# Patient Record
Sex: MALE | Race: WHITE | Employment: OTHER | URBAN - NONMETROPOLITAN AREA
[De-identification: names, ages, dates, MRNs, and addresses within clinical notes are randomized per-mention and may not be internally consistent; named-entity substitution may affect disease eponyms.]

---

## 2020-11-26 ENCOUNTER — HOSPITAL ENCOUNTER (INPATIENT)
Age: 85
LOS: 4 days | Discharge: HOME | DRG: 682 | End: 2020-11-30
Attending: FAMILY MEDICINE
Payer: MEDICARE

## 2020-11-26 PROBLEM — U07.1 COVID-19 VIRUS INFECTION: Status: ACTIVE | Noted: 2020-11-26

## 2020-11-26 LAB — FIBRINOGEN: 311 MG/100ML (ref 155–475)

## 2020-11-26 PROCEDURE — 86140 C-REACTIVE PROTEIN: CPT

## 2020-11-26 PROCEDURE — 99223 1ST HOSP IP/OBS HIGH 75: CPT | Performed by: INTERNAL MEDICINE

## 2020-11-26 PROCEDURE — 82728 ASSAY OF FERRITIN: CPT

## 2020-11-26 PROCEDURE — 85385 FIBRINOGEN ANTIGEN: CPT

## 2020-11-26 PROCEDURE — 36415 COLL VENOUS BLD VENIPUNCTURE: CPT

## 2020-11-26 PROCEDURE — 1200000000 HC SEMI PRIVATE

## 2020-11-26 RX ORDER — PANTOPRAZOLE SODIUM 40 MG/1
40 TABLET, DELAYED RELEASE ORAL DAILY
Status: DISCONTINUED | OUTPATIENT
Start: 2020-11-27 | End: 2020-11-30 | Stop reason: HOSPADM

## 2020-11-26 RX ORDER — ROSUVASTATIN CALCIUM 10 MG/1
10 TABLET, COATED ORAL DAILY
COMMUNITY

## 2020-11-26 RX ORDER — GUAIFENESIN/DEXTROMETHORPHAN 100-10MG/5
5 SYRUP ORAL EVERY 4 HOURS PRN
Status: DISCONTINUED | OUTPATIENT
Start: 2020-11-26 | End: 2020-11-30 | Stop reason: HOSPADM

## 2020-11-26 RX ORDER — ACETAMINOPHEN 325 MG/1
650 TABLET ORAL EVERY 6 HOURS PRN
Status: DISCONTINUED | OUTPATIENT
Start: 2020-11-26 | End: 2020-11-30 | Stop reason: HOSPADM

## 2020-11-26 RX ORDER — LISINOPRIL 20 MG/1
20 TABLET ORAL 2 TIMES DAILY
COMMUNITY

## 2020-11-26 RX ORDER — VITAMIN B COMPLEX
2000 TABLET ORAL DAILY
Status: DISCONTINUED | OUTPATIENT
Start: 2020-11-27 | End: 2020-11-30 | Stop reason: HOSPADM

## 2020-11-26 RX ORDER — AMLODIPINE BESYLATE 5 MG/1
5 TABLET ORAL DAILY
Status: DISCONTINUED | OUTPATIENT
Start: 2020-11-27 | End: 2020-11-30 | Stop reason: HOSPADM

## 2020-11-26 RX ORDER — ROSUVASTATIN CALCIUM 10 MG/1
10 TABLET, COATED ORAL DAILY
Status: DISCONTINUED | OUTPATIENT
Start: 2020-11-27 | End: 2020-11-30 | Stop reason: HOSPADM

## 2020-11-26 RX ORDER — PANTOPRAZOLE SODIUM 40 MG/1
40 TABLET, DELAYED RELEASE ORAL DAILY
COMMUNITY

## 2020-11-26 RX ORDER — TAMSULOSIN HYDROCHLORIDE 0.4 MG/1
0.4 CAPSULE ORAL DAILY
Status: DISCONTINUED | OUTPATIENT
Start: 2020-11-27 | End: 2020-11-30 | Stop reason: HOSPADM

## 2020-11-26 RX ORDER — CLOPIDOGREL BISULFATE 75 MG/1
75 TABLET ORAL DAILY
Status: DISCONTINUED | OUTPATIENT
Start: 2020-11-27 | End: 2020-11-30 | Stop reason: HOSPADM

## 2020-11-26 RX ORDER — SODIUM CHLORIDE 9 MG/ML
INJECTION, SOLUTION INTRAVENOUS CONTINUOUS
Status: DISCONTINUED | OUTPATIENT
Start: 2020-11-26 | End: 2020-11-28

## 2020-11-26 RX ORDER — TRAMADOL HYDROCHLORIDE 50 MG/1
50 TABLET ORAL EVERY 6 HOURS PRN
Status: DISCONTINUED | OUTPATIENT
Start: 2020-11-26 | End: 2020-11-30 | Stop reason: HOSPADM

## 2020-11-26 RX ORDER — TAMSULOSIN HYDROCHLORIDE 0.4 MG/1
0.4 CAPSULE ORAL DAILY
COMMUNITY

## 2020-11-26 RX ORDER — TRAMADOL HYDROCHLORIDE 50 MG/1
50 TABLET ORAL EVERY 6 HOURS PRN
Status: ON HOLD | COMMUNITY
Start: 2020-11-19 | End: 2020-11-30 | Stop reason: HOSPADM

## 2020-11-26 RX ORDER — CLOPIDOGREL BISULFATE 75 MG/1
75 TABLET ORAL DAILY
COMMUNITY

## 2020-11-26 RX ORDER — ASPIRIN 325 MG
325 TABLET ORAL DAILY
Status: DISCONTINUED | OUTPATIENT
Start: 2020-11-27 | End: 2020-11-30 | Stop reason: HOSPADM

## 2020-11-26 RX ORDER — ACETAMINOPHEN 650 MG/1
650 SUPPOSITORY RECTAL EVERY 6 HOURS PRN
Status: DISCONTINUED | OUTPATIENT
Start: 2020-11-26 | End: 2020-11-30 | Stop reason: HOSPADM

## 2020-11-26 RX ORDER — AMLODIPINE BESYLATE 5 MG/1
5 TABLET ORAL DAILY
COMMUNITY

## 2020-11-26 RX ORDER — ASPIRIN 325 MG
325 TABLET ORAL DAILY
COMMUNITY

## 2020-11-26 NOTE — PROGRESS NOTES
Patient is an 80year old from [facility] with Dr Malick Wu transferring. 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 Patient was scheduled to have a port placed to begin treatment so he had a pre-op Covid swab done performed and was positive. Daughter brought this patient in today because patient \"seemed off\". Patient answers questions appropriately and Dr Malick Wu states that his mind seemed sharp. Patient has had diarrhea x 2 days and has not been eating or drinking. 1 week ago creatinine was 1.0 and BUN 19. Today creatinine is 2.2 and BUN 57. Nothing else pertinent as far as workup. /56, HR 64, RR 16, SPO2 96% room air. Patient to be admitted to [facility] as inpatient medical for DIMITRY in the setting of Covid-19.

## 2020-11-27 ENCOUNTER — APPOINTMENT (OUTPATIENT)
Dept: GENERAL RADIOLOGY | Age: 85
DRG: 682 | End: 2020-11-27
Attending: FAMILY MEDICINE
Payer: MEDICARE

## 2020-11-27 ENCOUNTER — APPOINTMENT (OUTPATIENT)
Dept: INTERVENTIONAL RADIOLOGY/VASCULAR | Age: 85
DRG: 682 | End: 2020-11-27
Attending: FAMILY MEDICINE
Payer: MEDICARE

## 2020-11-27 LAB
ALBUMIN SERPL-MCNC: 3.1 G/DL (ref 3.5–5.1)
ALP BLD-CCNC: 433 U/L (ref 38–126)
ALT SERPL-CCNC: 29 U/L (ref 11–66)
ANION GAP SERPL CALCULATED.3IONS-SCNC: 16 MEQ/L (ref 8–16)
AST SERPL-CCNC: 51 U/L (ref 5–40)
BASOPHILS # BLD: 0.2 %
BASOPHILS ABSOLUTE: 0 THOU/MM3 (ref 0–0.1)
BILIRUB SERPL-MCNC: 1.3 MG/DL (ref 0.3–1.2)
BUN BLDV-MCNC: 43 MG/DL (ref 7–22)
C-REACTIVE PROTEIN: 6.85 MG/DL (ref 0–1)
CALCIUM SERPL-MCNC: 9.1 MG/DL (ref 8.5–10.5)
CHLORIDE BLD-SCNC: 106 MEQ/L (ref 98–111)
CO2: 19 MEQ/L (ref 23–33)
CREAT SERPL-MCNC: 1.6 MG/DL (ref 0.4–1.2)
EOSINOPHIL # BLD: 0 %
EOSINOPHILS ABSOLUTE: 0 THOU/MM3 (ref 0–0.4)
ERYTHROCYTE [DISTWIDTH] IN BLOOD BY AUTOMATED COUNT: 19.8 % (ref 11.5–14.5)
ERYTHROCYTE [DISTWIDTH] IN BLOOD BY AUTOMATED COUNT: 60.2 FL (ref 35–45)
FERRITIN: 211 NG/ML (ref 22–322)
FIBRINOGEN: 289 MG/100ML (ref 155–475)
GFR SERPL CREATININE-BSD FRML MDRD: 41 ML/MIN/1.73M2
GLUCOSE BLD-MCNC: 88 MG/DL (ref 70–108)
HCT VFR BLD CALC: 34.7 % (ref 42–52)
HEMOGLOBIN: 11.8 GM/DL (ref 14–18)
IMMATURE GRANS (ABS): 0.03 THOU/MM3 (ref 0–0.07)
IMMATURE GRANULOCYTES: 0.6 %
LYMPHOCYTES # BLD: 5 %
LYMPHOCYTES ABSOLUTE: 0.3 THOU/MM3 (ref 1–4.8)
MCH RBC QN AUTO: 28.9 PG (ref 26–33)
MCHC RBC AUTO-ENTMCNC: 34 GM/DL (ref 32.2–35.5)
MCV RBC AUTO: 84.8 FL (ref 80–94)
MONOCYTES # BLD: 15.9 %
MONOCYTES ABSOLUTE: 0.8 THOU/MM3 (ref 0.4–1.3)
NUCLEATED RED BLOOD CELLS: 0 /100 WBC
PLATELET # BLD: 153 THOU/MM3 (ref 130–400)
PMV BLD AUTO: 12 FL (ref 9.4–12.4)
POTASSIUM SERPL-SCNC: 4.5 MEQ/L (ref 3.5–5.2)
PRO-BNP: 1931 PG/ML (ref 0–1800)
RBC # BLD: 4.09 MILL/MM3 (ref 4.7–6.1)
SEG NEUTROPHILS: 78.3 %
SEGMENTED NEUTROPHILS ABSOLUTE COUNT: 3.9 THOU/MM3 (ref 1.8–7.7)
SODIUM BLD-SCNC: 141 MEQ/L (ref 135–145)
TOTAL PROTEIN: 6.1 G/DL (ref 6.1–8)
WBC # BLD: 5 THOU/MM3 (ref 4.8–10.8)

## 2020-11-27 PROCEDURE — 85025 COMPLETE CBC W/AUTO DIFF WBC: CPT

## 2020-11-27 PROCEDURE — 93970 EXTREMITY STUDY: CPT

## 2020-11-27 PROCEDURE — 6360000002 HC RX W HCPCS: Performed by: INTERNAL MEDICINE

## 2020-11-27 PROCEDURE — 1200000000 HC SEMI PRIVATE

## 2020-11-27 PROCEDURE — 51701 INSERT BLADDER CATHETER: CPT

## 2020-11-27 PROCEDURE — 51798 US URINE CAPACITY MEASURE: CPT

## 2020-11-27 PROCEDURE — 85385 FIBRINOGEN ANTIGEN: CPT

## 2020-11-27 PROCEDURE — 99232 SBSQ HOSP IP/OBS MODERATE 35: CPT | Performed by: NURSE PRACTITIONER

## 2020-11-27 PROCEDURE — 6370000000 HC RX 637 (ALT 250 FOR IP): Performed by: NURSE PRACTITIONER

## 2020-11-27 PROCEDURE — 83880 ASSAY OF NATRIURETIC PEPTIDE: CPT

## 2020-11-27 PROCEDURE — 36415 COLL VENOUS BLD VENIPUNCTURE: CPT

## 2020-11-27 PROCEDURE — 71045 X-RAY EXAM CHEST 1 VIEW: CPT

## 2020-11-27 PROCEDURE — 6370000000 HC RX 637 (ALT 250 FOR IP): Performed by: INTERNAL MEDICINE

## 2020-11-27 PROCEDURE — 2580000003 HC RX 258: Performed by: INTERNAL MEDICINE

## 2020-11-27 PROCEDURE — 80053 COMPREHEN METABOLIC PANEL: CPT

## 2020-11-27 RX ORDER — ZINC SULFATE 50(220)MG
50 CAPSULE ORAL DAILY
Status: DISCONTINUED | OUTPATIENT
Start: 2020-11-27 | End: 2020-11-30 | Stop reason: HOSPADM

## 2020-11-27 RX ORDER — ASCORBIC ACID 500 MG
1000 TABLET ORAL DAILY
Status: DISCONTINUED | OUTPATIENT
Start: 2020-11-27 | End: 2020-11-30 | Stop reason: HOSPADM

## 2020-11-27 RX ADMIN — ASPIRIN 325 MG: 325 TABLET, FILM COATED ORAL at 08:26

## 2020-11-27 RX ADMIN — PANTOPRAZOLE SODIUM 40 MG: 40 TABLET, DELAYED RELEASE ORAL at 08:26

## 2020-11-27 RX ADMIN — AMLODIPINE BESYLATE 5 MG: 5 TABLET ORAL at 13:25

## 2020-11-27 RX ADMIN — OXYCODONE HYDROCHLORIDE AND ACETAMINOPHEN 1000 MG: 500 TABLET ORAL at 13:25

## 2020-11-27 RX ADMIN — ROSUVASTATIN CALCIUM 10 MG: 10 TABLET, FILM COATED ORAL at 13:25

## 2020-11-27 RX ADMIN — Medication 2000 UNITS: at 13:25

## 2020-11-27 RX ADMIN — TAMSULOSIN HYDROCHLORIDE 0.4 MG: 0.4 CAPSULE ORAL at 13:25

## 2020-11-27 RX ADMIN — Medication 50 MG: at 13:24

## 2020-11-27 RX ADMIN — CLOPIDOGREL BISULFATE 75 MG: 75 TABLET ORAL at 08:26

## 2020-11-27 RX ADMIN — TRAMADOL HYDROCHLORIDE 50 MG: 50 TABLET ORAL at 17:24

## 2020-11-27 RX ADMIN — ENOXAPARIN SODIUM 30 MG: 30 INJECTION SUBCUTANEOUS at 08:26

## 2020-11-27 RX ADMIN — SODIUM CHLORIDE: 9 INJECTION, SOLUTION INTRAVENOUS at 00:07

## 2020-11-27 ASSESSMENT — PAIN SCALES - GENERAL: PAINLEVEL_OUTOF10: 8

## 2020-11-27 NOTE — PROCEDURES
A Bladder scan was performed at 1645 . The residual amount was measured to be 45 ML. Report of results was given to [facility].

## 2020-11-27 NOTE — PROGRESS NOTES
Pharmacy Enoxaparin Consult    Ayan Malone is a 80 y.o. male. Pharmacy has been consulted to renally adjust enoxaparin. No results for input(s): BUN in the last 72 hours. No results for input(s): CREATININE in the last 72 hours. CrCl cannot be calculated (No successful lab value found. ). No results found for: HGB, HCT, PLT    Height:   Ht Readings from Last 1 Encounters:   11/26/20 5' 10\" (1.778 m)     Weight:  Wt Readings from Last 1 Encounters:   11/26/20 169 lb 11.2 oz (77 kg)       BMI: Body mass index is 24.35 kg/m².     Enoxaparin Indication: DVT prophylaxis, incidental COVID finding    Plan: Based upon renal function, the enoxaparin dose should be adjusted to 30mg daily    Lyla Darnell RPh, RUBEN, BCGP  11/26/2020     10:21 PM

## 2020-11-27 NOTE — PROGRESS NOTES
Pt has significant issues with trying to void overnight. Would state that he felt like he needed to void, but would take approximately 30-40 mins before able to voiding only a small amount. RN notified PA. RN also noticed pt has significant bright red drainage coming from urethral hole; RN also noticed small blood clots when dabbing dry. RN obtained bladder scan of approximately 320. RN straight cathed with pt tolerating intervention very poorly. RN noticed small blood clots in urine. Look almost to be coming from shaft rather than bladder. Blood also noticed on tubing of catheter before reaching bladder. Obtained urine sample and set to side.  Will continue to monitor urinary status    You Carrington RN

## 2020-11-27 NOTE — H&P
Provider, MD   lisinopril (PRINIVIL;ZESTRIL) 20 MG tablet Take 20 mg by mouth 2 times daily   Yes Historical Provider, MD   pantoprazole (PROTONIX) 40 MG tablet Take 40 mg by mouth daily   Yes Historical Provider, MD   rosuvastatin (CRESTOR) 10 MG tablet Take 10 mg by mouth daily   Yes Historical Provider, MD   traMADol (ULTRAM) 50 MG tablet Take 50 mg by mouth every 6 hours as needed. 11/19/20 11/29/20 Yes Historical Provider, MD   tamsulosin (FLOMAX) 0.4 MG capsule Take 0.4 mg by mouth daily   Yes Historical Provider, MD       Allergies:  Patient has no known allergies. Social History:      The patient currently lives family    TOBACCO:   reports that he has never smoked. He has never used smokeless tobacco.  ETOH:   has no history on file for alcohol. Family History:       Reviewed in detail and negative for DM, CAD, Cancer, CVA. Positive as follows:    No family history on file. Diet:  No diet orders on file    REVIEW OF SYSTEMS:   Pertinent positives as noted in the HPI. All other systems reviewed and negative. PHYSICAL EXAM:    /60   Pulse 68   Temp 98.2 °F (36.8 °C) (Oral)   Resp 25   Ht 5' 10\" (1.778 m)   Wt 169 lb 11.2 oz (77 kg)   SpO2 94%   BMI 24.35 kg/m²     General appearance:  Frail but appropriate age  HEENT:  Normal cephalic, atraumatic without obvious deformity. Pupils equal, round, and reactive to light. Extra ocular muscles intact. Conjunctivae/corneas clear. Neck: Supple, with full range of motion. No jugular venous distention. Trachea midline. Respiratory:  Normal respiratory effort. Clear to auscultation, bilaterally without Rales/Wheezes/Rhonchi. Cardiovascular:  Regular rate and rhythm with normal S1/S2 without murmurs, rubs or gallops. Abdomen: Soft, non-tender, non-distended with normal bowel sounds. Musculoskeletal:  No clubbing, cyanosis or edema bilaterally. Full range of motion without deformity.   Skin: Skin color, texture, turgor normal.  No rashes or lesions. Neurologic:  Cranial nerves: II-XII intact, grossly non-focal. Poor memory and fund of information. Unable to answer easy questions  Psychiatric:  Alert and oriented, thought content appropriate, normal insight  Capillary Refill: Brisk,< 3 seconds   Peripheral Pulses: +2 palpable, equal bilaterally       Labs:     No results for input(s): WBC, HGB, HCT, PLT in the last 72 hours. No results for input(s): NA, K, CL, CO2, BUN, CREATININE, CALCIUM, PHOS in the last 72 hours. Invalid input(s): MAGNES  No results for input(s): AST, ALT, BILIDIR, BILITOT, ALKPHOS in the last 72 hours. No results for input(s): INR in the last 72 hours. No results for input(s): Renetta Comment in the last 72 hours. Urinalysis:    No results found for: NITRU, WBCUA, BACTERIA, RBCUA, BLOODU, SPECGRAV, GLUCOSEU    Intake & Output:  I/O last 3 completed shifts: In: 0   Out: 100 [Urine:100]  I/O this shift:  In: -   Out: 300 [Urine:300]      Radiology:     CXR: I have reviewed the CXR with the following interpretation:   EKG:  I have reviewed the EKG with the following interpretation:     No orders to display        DVT prophylaxis: lovenox    Code Status: No Order      PT/OT Eval Status: yes    [place] Problems    Diagnosis Date Noted    COVID-19 virus infection [U07.1] 11/26/2020       PLAN:    1. Acute renal injury- possibly from diarrhea and poor oral intake that intrinsic going by elevated BUN/creat at short period. Check Fena x1. Start IVF and hold nephrotoxic medication. Will consider nephrology consult if not improving  2. covid 19 infection- currently asymptomatic. Will hold off CXR unless SOB prevails. Not treatment for now  3. Gastric cancer- supposed to get chemo port for chemotherapy. Will have to clear covid 19 before procedure. To fu with vascular post discharge  4. HTN- at baseline and normal range. Continue to ACEI and norvasc  5.  CAD- continue Crestor and plavix      Thank you

## 2020-11-27 NOTE — PROGRESS NOTES
Palliative care eval received \"per protocol\" from  to assist with goals of care. Pt admitted from [facility] ED for AMS, dehydration, DIMITRY. Per chart review, pt was to have port placed for chemo at [facility]. Pt was diagnosed with GI cancer and liver mets. Routine COVID swab was done prior to procedure and pt was COVVID +, hence port was not done. Pt was home and became confused, decreased oral intake. Pt's daughter brought him to [facility] ER and pt was transferred here. Case discussed with pt's nurse,RONALDO Grimes. Pt remains confused, no resp distress. Writer did not see. Secure text sent to [facility] to discuss case . Per her reply, she has not seen pt yet but will contact palliative care, as needed, for any follow up she would like. Palliative care will remain available.

## 2020-11-27 NOTE — CARE COORDINATION
DISASTER CHARTING    11/27/20, 9:27 AM EST    DISCHARGE ONGOING EVALUATION:     Bob Alvarez day: 1  Location: [facility]33/033-A Reason for admit: COVID-19 virus infection [U07.1]   Barriers to Discharge: to [facility] with new onset confusion; IVF, voiding bloody urine with clots, bladder scan. PCP: No primary care provider on file. new PCP  Jake Esparza  Patient Goals/Plan/Treatment Preferences: spoke with daughter Sydnie Dave as she resides with her father. She is a [employer] aide and has lost her sense of taste and smell also and is quarantined. He was up in middle of night, had all the lights on, confused so she took him to [facility] [place]. He is in process of getting chemo port and starting chemo at [facility] with Dr Mónica Gan 12/3. She contacted hospice once patient was diagnosed as the type of stomach CA he has is quite rare per daughter. He has walker, and she is unsure of his needs at this time. Palliative care consulted.

## 2020-11-27 NOTE — PROGRESS NOTES
Hospitalist Progress Note    Patient:  Kelsey White      Unit/Bed:8B-33/033-A    YOB: 1934    MRN: [id number]       Acct: [de-identified]     PCP: No primary care provider on file. Date of Admission: 11/26/2020    Assessment/Plan:    1. Acute renal injury- possibly from diarrhea and poor oral intake. On arrival creatinine 2.5, now 1.6 s/p IVF and holding nephrotoxic medication. Hold IVF due to edema until etiology clarified. Will consider nephrology consult  2. Bilateral lower ext edema. ? Volume overload vs DVT. Check dopplers, bnp and repeat cxr. Faint crackles on exam, possibly from [place]  Will consider echo pending results of above. 3. Urinary retention. Nursing staff unable to place gamino. Consult urology. 4. Hematuria due to traumatic cath. Hold Lovenox for now. 5. covid 19 infection- currently asymptomatic. Will hold off CXR unless SOB prevails. Not active treatment for now. Start Zinc, vit d and vit C.   6. Gastric cancer- supposed to get chemo port for chemotherapy. Will have to clear covid 19 before procedure. To fu with vascular post discharge  7. HTN- at baseline and normal range. Continue  Norvasc. Holding ACE. 8. CAD- continue Crestor and plavix         Chief Complaint: increased confusion at home. Hospital Course:     80 y.o. male who presented to [facility] with the above complain. 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 He was taken to [facility] ER due to concern of increased confusion at home. He was reported to be having diarrhea at home and barely eating for several days. Mental status on review during triage was thought to be appropriate but on checking labs, his creatinine was found to have gone up from normal to 2.2 and BUN of 57 in one week. Rest of vital signs were normal. Patient was swabbed for Covid which was positive.  At bedside, he still has difficulty providing reliable information although is fully orient to place and time. He saturating normal on room air and denies any ongoing pain. Subjective (past 24 hours): Reports in general \"feels bad\". Penis pain. Hematuria noted in urinal. He denies SOB. States leg edema is new. Medications:  Reviewed    Infusion Medications    sodium chloride Stopped (11/27/20 1114)     Scheduled Medications    zinc sulfate  50 mg Oral Daily    vitamin C  1,000 mg Oral Daily    amLODIPine  5 mg Oral Daily    aspirin  325 mg Oral Daily    clopidogrel  75 mg Oral Daily    pantoprazole  40 mg Oral Daily    rosuvastatin  10 mg Oral Daily    tamsulosin  0.4 mg Oral Daily    [Held by provider] enoxaparin  30 mg Subcutaneous Daily    Vitamin D  2,000 Units Oral Daily     PRN Meds: traMADol, acetaminophen **OR** acetaminophen, guaiFENesin-dextromethorphan      Intake/Output Summary (Last 24 hours) at 11/27/2020 1526  Last data filed at 11/27/2020 0830  Gross per 24 hour   Intake 360 ml   Output 760 ml   Net -400 ml       Diet:  No diet orders on file    Exam:  /65   Pulse 71   Temp 97.6 °F (36.4 °C) (Oral)   Resp 18   Ht 5' 10\" (1.778 m)   Wt 169 lb 11.2 oz (77 kg)   SpO2 93%   BMI 24.35 kg/m²     General appearance: Chronically ill appearing. No apparent distress, appears stated age and cooperative. HEENT: Pupils equal, round, and reactive to light. Conjunctivae/corneas clear. Neck: Supple, with full range of motion. No jugular venous distention. Trachea midline. Respiratory:  Normal respiratory effort. Clear to auscultation, bilaterally without Rales/Wheezes/Rhonchi. Cardiovascular: Regular rate and rhythm with normal S1/S2 without murmurs, rubs or gallops. Mild ankle/feet edema. Abdomen: Soft, non-tender, non-distended with normal bowel sounds. Musculoskeletal: passive and active ROM x 4 extremities.   Skin: Skin color, texture, turgor normal.    Neurologic:  Neurovascularly intact without any focal sensory/motor deficits. Cranial nerves: II-XII intact, grossly non-focal.  Psychiatric: Alert and oriented, thought content appropriate  Capillary Refill: Brisk,< 3 seconds   Peripheral Pulses: +2 palpable, equal bilaterally       Labs:   Recent Labs     11/27/20  1147   WBC 5.0   HGB 11.8*   HCT 34.7*        Recent Labs     11/27/20  1147      K 4.5      CO2 19*   BUN 43*   CREATININE 1.6*   CALCIUM 9.1     Recent Labs     11/27/20  1147   AST 51*   ALT 29   BILITOT 1.3*   ALKPHOS 433*     No results for input(s): INR in the last 72 hours. No results for input(s): Angelia Belts in the last 72 hours. No results for input(s): PROCAL in the last 72 hours.     Microbiology:      Urinalysis:    No results found for: Sebastienta Danjovan, BACTERIA, RBCUA, BLOODU, [address], [address]    Radiology:  VL DUP LOWER EXTREMITY VENOUS BILATERAL    (Results Pending)   XR CHEST PORTABLE    (Results Pending)        Code Status: No Order        Active Hospital Problems    Diagnosis Date Noted    COVID-19 virus infection [U07.1] 11/26/2020       Electronically signed by VIRGINIA Landis CNP on 11/27/2020 at 3:26 PM

## 2020-11-28 LAB
ANION GAP SERPL CALCULATED.3IONS-SCNC: 16 MEQ/L (ref 8–16)
BASOPHILS # BLD: 0.2 %
BASOPHILS ABSOLUTE: 0 THOU/MM3 (ref 0–0.1)
BUN BLDV-MCNC: 43 MG/DL (ref 7–22)
CALCIUM SERPL-MCNC: 8.7 MG/DL (ref 8.5–10.5)
CHLORIDE BLD-SCNC: 108 MEQ/L (ref 98–111)
CO2: 18 MEQ/L (ref 23–33)
CREAT SERPL-MCNC: 1.3 MG/DL (ref 0.4–1.2)
EOSINOPHIL # BLD: 0 %
EOSINOPHILS ABSOLUTE: 0 THOU/MM3 (ref 0–0.4)
ERYTHROCYTE [DISTWIDTH] IN BLOOD BY AUTOMATED COUNT: 19.5 % (ref 11.5–14.5)
ERYTHROCYTE [DISTWIDTH] IN BLOOD BY AUTOMATED COUNT: 58 FL (ref 35–45)
GFR SERPL CREATININE-BSD FRML MDRD: 52 ML/MIN/1.73M2
GLUCOSE BLD-MCNC: 88 MG/DL (ref 70–108)
HCT VFR BLD CALC: 30.8 % (ref 42–52)
HEMOGLOBIN: 10.8 GM/DL (ref 14–18)
IMMATURE GRANS (ABS): 0.04 THOU/MM3 (ref 0–0.07)
IMMATURE GRANULOCYTES: 0.8 %
LYMPHOCYTES # BLD: 5.9 %
LYMPHOCYTES ABSOLUTE: 0.3 THOU/MM3 (ref 1–4.8)
MCH RBC QN AUTO: 28.9 PG (ref 26–33)
MCHC RBC AUTO-ENTMCNC: 35.1 GM/DL (ref 32.2–35.5)
MCV RBC AUTO: 82.4 FL (ref 80–94)
MONOCYTES # BLD: 17.2 %
MONOCYTES ABSOLUTE: 0.9 THOU/MM3 (ref 0.4–1.3)
NUCLEATED RED BLOOD CELLS: 0 /100 WBC
PLATELET # BLD: 128 THOU/MM3 (ref 130–400)
PMV BLD AUTO: 11.8 FL (ref 9.4–12.4)
POTASSIUM SERPL-SCNC: 4.5 MEQ/L (ref 3.5–5.2)
RBC # BLD: 3.74 MILL/MM3 (ref 4.7–6.1)
SEG NEUTROPHILS: 75.9 %
SEGMENTED NEUTROPHILS ABSOLUTE COUNT: 3.9 THOU/MM3 (ref 1.8–7.7)
SODIUM BLD-SCNC: 142 MEQ/L (ref 135–145)
WBC # BLD: 5.2 THOU/MM3 (ref 4.8–10.8)

## 2020-11-28 PROCEDURE — 36415 COLL VENOUS BLD VENIPUNCTURE: CPT

## 2020-11-28 PROCEDURE — 85025 COMPLETE CBC W/AUTO DIFF WBC: CPT

## 2020-11-28 PROCEDURE — 51798 US URINE CAPACITY MEASURE: CPT

## 2020-11-28 PROCEDURE — 6370000000 HC RX 637 (ALT 250 FOR IP): Performed by: INTERNAL MEDICINE

## 2020-11-28 PROCEDURE — 99232 SBSQ HOSP IP/OBS MODERATE 35: CPT | Performed by: NURSE PRACTITIONER

## 2020-11-28 PROCEDURE — 99221 1ST HOSP IP/OBS SF/LOW 40: CPT | Performed by: UROLOGY

## 2020-11-28 PROCEDURE — 6360000002 HC RX W HCPCS: Performed by: INTERNAL MEDICINE

## 2020-11-28 PROCEDURE — 1200000000 HC SEMI PRIVATE

## 2020-11-28 PROCEDURE — 80048 BASIC METABOLIC PNL TOTAL CA: CPT

## 2020-11-28 PROCEDURE — 6370000000 HC RX 637 (ALT 250 FOR IP): Performed by: NURSE PRACTITIONER

## 2020-11-28 RX ORDER — FUROSEMIDE 10 MG/ML
20 INJECTION INTRAMUSCULAR; INTRAVENOUS ONCE
Status: COMPLETED | OUTPATIENT
Start: 2020-11-28 | End: 2020-11-28

## 2020-11-28 RX ADMIN — TRAMADOL HYDROCHLORIDE 50 MG: 50 TABLET ORAL at 05:37

## 2020-11-28 RX ADMIN — OXYCODONE HYDROCHLORIDE AND ACETAMINOPHEN 1000 MG: 500 TABLET ORAL at 08:31

## 2020-11-28 RX ADMIN — TRAMADOL HYDROCHLORIDE 50 MG: 50 TABLET ORAL at 17:20

## 2020-11-28 RX ADMIN — Medication 50 MG: at 08:31

## 2020-11-28 RX ADMIN — FUROSEMIDE 20 MG: 10 INJECTION, SOLUTION INTRAMUSCULAR; INTRAVENOUS at 05:28

## 2020-11-28 RX ADMIN — ROSUVASTATIN CALCIUM 10 MG: 10 TABLET, FILM COATED ORAL at 08:31

## 2020-11-28 RX ADMIN — TAMSULOSIN HYDROCHLORIDE 0.4 MG: 0.4 CAPSULE ORAL at 08:31

## 2020-11-28 RX ADMIN — PANTOPRAZOLE SODIUM 40 MG: 40 TABLET, DELAYED RELEASE ORAL at 08:31

## 2020-11-28 RX ADMIN — AMLODIPINE BESYLATE 5 MG: 5 TABLET ORAL at 08:31

## 2020-11-28 RX ADMIN — Medication 2000 UNITS: at 08:31

## 2020-11-28 ASSESSMENT — PAIN SCALES - GENERAL
PAINLEVEL_OUTOF10: 0
PAINLEVEL_OUTOF10: 0
PAINLEVEL_OUTOF10: 6
PAINLEVEL_OUTOF10: 5

## 2020-11-28 ASSESSMENT — PAIN DESCRIPTION - ORIENTATION: ORIENTATION: LOWER

## 2020-11-28 ASSESSMENT — PAIN DESCRIPTION - PROGRESSION: CLINICAL_PROGRESSION: GRADUALLY WORSENING

## 2020-11-28 ASSESSMENT — PAIN DESCRIPTION - DESCRIPTORS: DESCRIPTORS: ACHING

## 2020-11-28 ASSESSMENT — PAIN DESCRIPTION - ONSET: ONSET: ON-GOING

## 2020-11-28 ASSESSMENT — PAIN DESCRIPTION - PAIN TYPE: TYPE: CHRONIC PAIN

## 2020-11-28 ASSESSMENT — PAIN DESCRIPTION - FREQUENCY: FREQUENCY: CONTINUOUS

## 2020-11-28 ASSESSMENT — PAIN DESCRIPTION - LOCATION: LOCATION: BACK

## 2020-11-28 ASSESSMENT — PAIN - FUNCTIONAL ASSESSMENT: PAIN_FUNCTIONAL_ASSESSMENT: PREVENTS OR INTERFERES SOME ACTIVE ACTIVITIES AND ADLS

## 2020-11-28 NOTE — PROGRESS NOTES
Hospitalist Progress Note    Patient:  Mike Mode      Unit/Bed:8B-33/033-A    YOB: 1934    MRN: [id number]       Acct: [de-identified]     PCP: No primary care provider on file. Date of Admission: 11/26/2020    Assessment/Plan:    1. Acute renal injury- possibly from diarrhea and poor oral intake. On arrival creatinine 2.5, now 1.3 s/p IVF and holding nephrotoxic medication. Hold IVF due to edema until etiology clarified. 2. Bilateral lower ext edema. Stop IVF. Obtain echo. Howard doppler negative for DVT. 3. Urinary retention. Nursing staff unable to place gamino. Urology. Voiding overnight. Holding off of catheter placement. 4. Hematuria due to traumatic cath, improved. Holding Lovenox, Plavix and ASA for now. 5. covid 19 infection- currently asymptomatic. Will hold off CXR unless SOB prevails. Not active treatment for now. Zinc, vit d and vit C.   6. Gastric cancer- supposed to get chemo port for chemotherapy. Will have to clear covid 19 before procedure. To fu with vascular post discharge  7. HTN- at baseline and normal range. Continue  Norvasc. Holding ACE. 8. CAD- continue Crestor and plavix         Chief Complaint: increased confusion at home. Hospital Course:     80 y.o. male who presented to [address] [place] [address] with the above complain. 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 He was taken to [facility] ER due to concern of increased confusion at home. He was reported to be having diarrhea at home and barely eating for several days. Mental status on review during triage was thought to be appropriate but on checking labs, his creatinine was found to have gone up from normal to 2.2 and BUN of 57 in one week. Rest of vital signs were normal. Patient was swabbed for Covid which was positive.  At bedside, he still has difficulty providing reliable information although is fully grossly non-focal.  Psychiatric: Alert and oriented, thought content appropriate  Capillary Refill: Brisk,< 3 seconds   Peripheral Pulses: +2 palpable, equal bilaterally       Labs:   Recent Labs     11/27/20  1147 11/28/20  0610   WBC 5.0 5.2   HGB 11.8* 10.8*   HCT 34.7* 30.8*    128*     Recent Labs     11/27/20  1147 11/28/20  0610    142   K 4.5 4.5    108   CO2 19* 18*   BUN 43* 43*   CREATININE 1.6* 1.3*   CALCIUM 9.1 8.7     Recent Labs     11/27/20  1147   AST 51*   ALT 29   BILITOT 1.3*   ALKPHOS 433*     No results for input(s): INR in the last 72 hours. No results for input(s): [place] in the last 72 hours. No results for input(s): PROCAL in the last 72 hours. Microbiology:      Urinalysis:    No results found for: Georgiasusane Lute, BACTERIA, RBCUA, BLOODU, [address], [address]    Radiology:  VL DUP LOWER EXTREMITY VENOUS BILATERAL   Final Result   No evidence of bilateral lower extremity DVT. Final report electronically signed by Dr. Elana Padron on 11/27/2020 8:41 PM      XR CHEST PORTABLE   Final Result   1. Status post sternotomy. 2. Increased density at the left lung base which may represent atelectasis or scarring. 3. Atherosclerotic calcification aortic arch. 4. Otherwise negative chest x-ray. .               **This report has been created using voice recognition software. It may contain minor errors which are inherent in voice recognition technology. **      Final report electronically signed by DR Aylin Chávez on 11/27/2020 4:27 PM           Code Status: No Order        Active Hospital Problems    Diagnosis Date Noted    COVID-19 virus infection [U07.1] 11/26/2020       Electronically signed by VIRGINIA Mtz CNP on 11/28/2020 at 3:42 PM

## 2020-11-28 NOTE — PROCEDURES
A Bladder scan was performed at 0237 . The patient's last void was at approximately at [address] . The residual amount was measured to be 118 ML. Report of results was given to [place] [facility].

## 2020-11-28 NOTE — PROGRESS NOTES
Writer speaks with Dr. Rivera Found concerning increasing bilateral crackles upon auscultation of lungs.  Physician is to order lasix  And states to continue IV fluids

## 2020-11-28 NOTE — CONSULTS
[address]  [address] [address]  Dept: [phone]  Loc: [phone]  Visit Date: 11/26/2020    Urology Consult Note    Reason for Consult:  urianry retention. Nursing staff unable to place gamino x 2. Now with hematuria and clots     Requesting Physician:  VIRGINIA Fajardo CNP    History Obtained From:  Patient, nurse, EMR    Chief Complaint: AMS, DIMITRY, Covid +    HISTORY OF PRESENT ILLNESS:                The patient is a 80 y.o. male with significant past medical history of see below who presented to [facility] ED with AMS, DIMITRY. He has hx of GI CA with liver mets. Urology consulted for retention. Urinary catheter was attempted X3 without success. On exam he denies CP, SOB, fever/chills, abdominal pain. He seems confused, able to answer questions, not sure if he is a good historian. Denies problems with urination in the past, no urologic history found. Past Medical History:        Diagnosis Date    CAD (coronary artery disease)     CHF (congestive heart failure) (HCC)     Hypertension      Past Surgical History:        Procedure Laterality Date    CARDIAC SURGERY      JOINT REPLACEMENT       Allergies:  Patient has no known allergies.   Social History:  Social History     Socioeconomic History    Marital status: Unknown     Spouse name: Not on file    Number of children: Not on file    Years of education: Not on file    Highest education level: Not on file   Occupational History    Not on file   Social Needs    Financial resource strain: Not on file    Food insecurity     Worry: Not on file     Inability: Not on file    Transportation needs     Medical: Not on file     Non-medical: Not on file   Tobacco Use    Smoking status: Never Smoker    Smokeless tobacco: Never Used   Substance and Sexual Activity    Alcohol use: Not on file    Drug use: Not on file    Sexual activity: Not on file   Lifestyle    Physical activity     Days per week: Not on file     Minutes per session: Not on file    Stress: Not on file   Relationships    Social connections     Talks on phone: Not on file     Gets together: Not on file     Attends Latter day service: Not on file     Active member of club or organization: Not on file     Attends meetings of clubs or organizations: Not on file     Relationship status: Not on file    Intimate partner violence     Fear of current or ex partner: Not on file     Emotionally abused: Not on file     Physically abused: Not on file     Forced sexual activity: Not on file   Other Topics Concern    Not on file   Social History Narrative    Not on file     Family History:   No family history on file. ROS:  Constitutional: Negative for chills, fatigue, fever, or weight loss. Eyes: Denies reported visual changes. ENT: Denies headache, difficulty swallowing, earache, and nosebleeds. Cardiovascular: Negative for chest pain, palpitations, tachycardia or edema. Respiratory: Denies cough or SOB. GI:The patient denies abdominal or flank pain, anorexia, nausea or vomiting. : see HPI. Musculoskeletal: Patient denies low back pain or painful or reduced range of ROM. Neurological: The patient denies any symptoms of neurological impairment or TIA. Psychiatric: Denies anxiety or depression. Skin: Denies rash or lesions. All remaining ROS negative. PHYSICAL EXAM:  VITALS:  /72   Pulse 71   Temp 97.8 °F (36.6 °C) (Oral)   Resp 18   Ht 5' 10\" (1.778 m)   Wt 169 lb 11.2 oz (77 kg)   SpO2 93%   BMI 24.35 kg/m² . Nursing note and vitals reviewed. Constitutional: Alert and oriented, poor historian, no acute distress, and cooperative to examination with appropriate mood and affect. HEENT:   Head:         Normocephalic and atraumatic. Mouth/Throat:          Mucous membranes are normal.   Eyes:         EOM are normal. No scleral icterus.   Nose:    The external appearance of the nose

## 2020-11-29 LAB
ANION GAP SERPL CALCULATED.3IONS-SCNC: 15 MEQ/L (ref 8–16)
BASOPHILS # BLD: 0.2 %
BASOPHILS ABSOLUTE: 0 THOU/MM3 (ref 0–0.1)
BUN BLDV-MCNC: 42 MG/DL (ref 7–22)
CALCIUM SERPL-MCNC: 8.9 MG/DL (ref 8.5–10.5)
CHLORIDE BLD-SCNC: 109 MEQ/L (ref 98–111)
CO2: 21 MEQ/L (ref 23–33)
CREAT SERPL-MCNC: 1.3 MG/DL (ref 0.4–1.2)
EOSINOPHIL # BLD: 0.2 %
EOSINOPHILS ABSOLUTE: 0 THOU/MM3 (ref 0–0.4)
ERYTHROCYTE [DISTWIDTH] IN BLOOD BY AUTOMATED COUNT: 19.8 % (ref 11.5–14.5)
ERYTHROCYTE [DISTWIDTH] IN BLOOD BY AUTOMATED COUNT: 61.1 FL (ref 35–45)
GFR SERPL CREATININE-BSD FRML MDRD: 52 ML/MIN/1.73M2
GLUCOSE BLD-MCNC: 93 MG/DL (ref 70–108)
HCT VFR BLD CALC: 32.4 % (ref 42–52)
HEMOGLOBIN: 10.8 GM/DL (ref 14–18)
IMMATURE GRANS (ABS): 0.03 THOU/MM3 (ref 0–0.07)
IMMATURE GRANULOCYTES: 0.6 %
LYMPHOCYTES # BLD: 5 %
LYMPHOCYTES ABSOLUTE: 0.3 THOU/MM3 (ref 1–4.8)
MCH RBC QN AUTO: 28.7 PG (ref 26–33)
MCHC RBC AUTO-ENTMCNC: 33.3 GM/DL (ref 32.2–35.5)
MCV RBC AUTO: 86.2 FL (ref 80–94)
MONOCYTES # BLD: 12.8 %
MONOCYTES ABSOLUTE: 0.7 THOU/MM3 (ref 0.4–1.3)
NUCLEATED RED BLOOD CELLS: 0 /100 WBC
PLATELET # BLD: 144 THOU/MM3 (ref 130–400)
PMV BLD AUTO: 12 FL (ref 9.4–12.4)
POTASSIUM SERPL-SCNC: 4.4 MEQ/L (ref 3.5–5.2)
RBC # BLD: 3.76 MILL/MM3 (ref 4.7–6.1)
SEG NEUTROPHILS: 81.2 %
SEGMENTED NEUTROPHILS ABSOLUTE COUNT: 4.2 THOU/MM3 (ref 1.8–7.7)
SODIUM BLD-SCNC: 145 MEQ/L (ref 135–145)
WBC # BLD: 5.2 THOU/MM3 (ref 4.8–10.8)

## 2020-11-29 PROCEDURE — 51798 US URINE CAPACITY MEASURE: CPT

## 2020-11-29 PROCEDURE — 99232 SBSQ HOSP IP/OBS MODERATE 35: CPT | Performed by: PHYSICIAN ASSISTANT

## 2020-11-29 PROCEDURE — APPNB30 APP NON BILLABLE TIME 0-30 MINS: Performed by: UROLOGY

## 2020-11-29 PROCEDURE — 36415 COLL VENOUS BLD VENIPUNCTURE: CPT

## 2020-11-29 PROCEDURE — 1200000000 HC SEMI PRIVATE

## 2020-11-29 PROCEDURE — 6370000000 HC RX 637 (ALT 250 FOR IP): Performed by: INTERNAL MEDICINE

## 2020-11-29 PROCEDURE — 80048 BASIC METABOLIC PNL TOTAL CA: CPT

## 2020-11-29 PROCEDURE — 6370000000 HC RX 637 (ALT 250 FOR IP): Performed by: NURSE PRACTITIONER

## 2020-11-29 PROCEDURE — 85025 COMPLETE CBC W/AUTO DIFF WBC: CPT

## 2020-11-29 RX ADMIN — Medication 2000 UNITS: at 07:48

## 2020-11-29 RX ADMIN — PANTOPRAZOLE SODIUM 40 MG: 40 TABLET, DELAYED RELEASE ORAL at 07:48

## 2020-11-29 RX ADMIN — ROSUVASTATIN CALCIUM 10 MG: 10 TABLET, FILM COATED ORAL at 07:48

## 2020-11-29 RX ADMIN — AMLODIPINE BESYLATE 5 MG: 5 TABLET ORAL at 07:48

## 2020-11-29 RX ADMIN — Medication 50 MG: at 07:48

## 2020-11-29 RX ADMIN — OXYCODONE HYDROCHLORIDE AND ACETAMINOPHEN 1000 MG: 500 TABLET ORAL at 07:48

## 2020-11-29 RX ADMIN — TAMSULOSIN HYDROCHLORIDE 0.4 MG: 0.4 CAPSULE ORAL at 07:48

## 2020-11-29 ASSESSMENT — PAIN SCALES - GENERAL
PAINLEVEL_OUTOF10: 0

## 2020-11-29 NOTE — PROCEDURES
A Bladder scan was performed at 1410 . The residual amount was measured to be 153 ML. Report of results was given to [facility].

## 2020-11-29 NOTE — PROGRESS NOTES
Hospitalist Progress Note      Patient:  Dre Fernandes    Unit/Bed:8B-33/033-A  YOB: 1934  MRN: [id number]   Acct: [de-identified]   PCP: No primary care provider on file. Date of Admission: 11/26/2020    Assessment/Plan:    1. DIMITRY: Secondary to diarrhea and poor oral intake. Prerenal.  Creatinine 2.5, 1.3. Avoid nephrotoxic medications. 2. Urinary retention: Urology consulted. Cath not placed at this time due to patient using urinal.  Bladder scans. 3. Bilateral LE edema: Duplex ultrasound of lower extremities negative for DVT. 1+ pitting edema. 4. Hematuria: Secondary to traumatic cath attempt. Holding blood thinners at this time. 5. COVID-19: Incidentally found before port placement at [facility]. Patient is asymptomatic. Continue zinc, vitamin D and vitamin C.  6. Gastric CA: Follows at the [facility]. Patient is supposed to receive chemotherapy, however patient will need to recover from COVID-19.  7. CAD: Continue home medications. Dispo: If patient is able to urinate, patient will be able to be discharged. Echo pending. Chief Complaint: Confusion    Initial H and P:-    \"86 y. o. male who presented to [address] [place] [address] with the above complain. 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 He was taken to [facility] ER due to concern of increased confusion at home. He was reported to be having diarrhea at home and barely eating for several days. Mental status on review during triage was thought to be appropriate but on checking labs, his creatinine was found to have gone up from normal to 2.2 and BUN of 57 in one week. Rest of vital signs were normal. Patient was swabbed for Covid which was positive. At bedside, he still has difficulty providing reliable information although is fully orient to place and time.  He saturating normal on room air and denies any ongoing pain. \"     Subjective (past 24 hours):   No acute events overnight. Patient able to urinate. Patient is alert and oriented. No issues or complaints at this time. Past medical history, family history, social history and allergies reviewed again and is unchanged since admission. ROS patient denies chest pain, shortness breath, abdominal pain, headache. Medications:  Reviewed    Infusion Medications   Scheduled Medications    zinc sulfate  50 mg Oral Daily    vitamin C  1,000 mg Oral Daily    amLODIPine  5 mg Oral Daily    [Held by provider] aspirin  325 mg Oral Daily    [Held by provider] clopidogrel  75 mg Oral Daily    pantoprazole  40 mg Oral Daily    rosuvastatin  10 mg Oral Daily    tamsulosin  0.4 mg Oral Daily    [Held by provider] enoxaparin  30 mg Subcutaneous Daily    Vitamin D  2,000 Units Oral Daily     PRN Meds: traMADol, acetaminophen **OR** acetaminophen, guaiFENesin-dextromethorphan      Intake/Output Summary (Last 24 hours) at 11/29/2020 1739  Last data filed at 11/29/2020 1718  Gross per 24 hour   Intake 150 ml   Output 600 ml   Net -450 ml       Diet:  DIET LOW SODIUM 2 GM; Exam:  /67   Pulse 79   Temp 97.7 °F (36.5 °C) (Oral)   Resp 16   Ht 5' 10\" (1.778 m)   Wt 169 lb 11.2 oz (77 kg)   SpO2 99%   BMI 24.35 kg/m²   General appearance: Chronically ill-appearing white male  HEENT: Pupils equal, round, and reactive to light. Conjunctivae/corneas clear. Neck: Supple, with full range of motion. No jugular venous distention. Trachea midline. Respiratory:  Normal respiratory effort on room air. Clear to auscultation, bilaterally without Rales/Wheezes/Rhonchi. Cardiovascular: Regular rate and rhythm with normal S1/S2 without murmurs, rubs or gallops. Abdomen: Soft, non-tender, non-distended with normal bowel sounds. Musculoskeletal: passive and active ROM x 4 extremities.   Skin: Skin color, texture, turgor normal.  No rashes or lesions. Neurologic:  Neurovascularly intact without any focal sensory/motor deficits. Cranial nerves: II-XII intact, grossly non-focal.  Psychiatric: Alert and oriented, thought content appropriate, normal insight  Capillary Refill: Brisk,< 3 seconds   Peripheral Pulses: +2 palpable, equal bilaterally     Labs:   Recent Labs     11/27/20  1147 11/28/20  0610 11/29/20  0543   WBC 5.0 5.2 5.2   HGB 11.8* 10.8* 10.8*   HCT 34.7* 30.8* 32.4*    128* 144     Recent Labs     11/27/20  1147 11/28/20  0610 11/29/20  0543    142 145   K 4.5 4.5 4.4    108 109   CO2 19* 18* 21*   BUN 43* 43* 42*   CREATININE 1.6* 1.3* 1.3*   CALCIUM 9.1 8.7 8.9     Recent Labs     11/27/20  1147   AST 51*   ALT 29   BILITOT 1.3*   ALKPHOS 433*     Radiology:  VL DUP LOWER EXTREMITY VENOUS BILATERAL   Final Result   No evidence of bilateral lower extremity DVT. Final report electronically signed by Dr. Michaele Castleman on 11/27/2020 8:41 PM      XR CHEST PORTABLE   Final Result   1. Status post sternotomy. 2. Increased density at the left lung base which may represent atelectasis or scarring. 3. Atherosclerotic calcification aortic arch. 4. Otherwise negative chest x-ray. .               **This report has been created using voice recognition software. It may contain minor errors which are inherent in voice recognition technology. **      Final report electronically signed by DR Imer Morris on 11/27/2020 4:27 PM        Electronically signed by MARYJANE Mac on 11/29/2020 at 5:39 PM

## 2020-11-30 VITALS
WEIGHT: 169.7 LBS | HEART RATE: 95 BPM | TEMPERATURE: 97.6 F | OXYGEN SATURATION: 96 % | RESPIRATION RATE: 18 BRPM | DIASTOLIC BLOOD PRESSURE: 66 MMHG | SYSTOLIC BLOOD PRESSURE: 116 MMHG | BODY MASS INDEX: 24.29 KG/M2 | HEIGHT: 70 IN

## 2020-11-30 LAB
LV EF: 55 %
LVEF MODALITY: NORMAL

## 2020-11-30 PROCEDURE — 6370000000 HC RX 637 (ALT 250 FOR IP): Performed by: NURSE PRACTITIONER

## 2020-11-30 PROCEDURE — 99239 HOSP IP/OBS DSCHRG MGMT >30: CPT | Performed by: PHYSICIAN ASSISTANT

## 2020-11-30 PROCEDURE — 6360000002 HC RX W HCPCS: Performed by: INTERNAL MEDICINE

## 2020-11-30 PROCEDURE — 6370000000 HC RX 637 (ALT 250 FOR IP): Performed by: INTERNAL MEDICINE

## 2020-11-30 PROCEDURE — 51798 US URINE CAPACITY MEASURE: CPT

## 2020-11-30 PROCEDURE — 93306 TTE W/DOPPLER COMPLETE: CPT

## 2020-11-30 PROCEDURE — 99232 SBSQ HOSP IP/OBS MODERATE 35: CPT | Performed by: NURSE PRACTITIONER

## 2020-11-30 RX ORDER — ZINC SULFATE 50(220)MG
50 CAPSULE ORAL DAILY
Qty: 30 CAPSULE | Refills: 3 | COMMUNITY
Start: 2020-12-01

## 2020-11-30 RX ORDER — CHOLECALCIFEROL (VITAMIN D3) 50 MCG
2000 TABLET ORAL DAILY
Qty: 60 TABLET | Refills: 0 | Status: SHIPPED | OUTPATIENT
Start: 2020-12-01

## 2020-11-30 RX ORDER — CEFDINIR 300 MG/1
300 CAPSULE ORAL EVERY 12 HOURS SCHEDULED
Qty: 14 CAPSULE | Refills: 0 | Status: SHIPPED | OUTPATIENT
Start: 2020-11-30 | End: 2020-12-07

## 2020-11-30 RX ORDER — CEFDINIR 300 MG/1
300 CAPSULE ORAL EVERY 12 HOURS SCHEDULED
Status: DISCONTINUED | OUTPATIENT
Start: 2020-11-30 | End: 2020-11-30 | Stop reason: HOSPADM

## 2020-11-30 RX ADMIN — ROSUVASTATIN CALCIUM 10 MG: 10 TABLET, FILM COATED ORAL at 07:35

## 2020-11-30 RX ADMIN — OXYCODONE HYDROCHLORIDE AND ACETAMINOPHEN 1000 MG: 500 TABLET ORAL at 07:35

## 2020-11-30 RX ADMIN — Medication 2000 UNITS: at 07:35

## 2020-11-30 RX ADMIN — CEFDINIR 300 MG: 300 CAPSULE ORAL at 11:37

## 2020-11-30 RX ADMIN — Medication 50 MG: at 07:35

## 2020-11-30 RX ADMIN — PANTOPRAZOLE SODIUM 40 MG: 40 TABLET, DELAYED RELEASE ORAL at 07:35

## 2020-11-30 RX ADMIN — TAMSULOSIN HYDROCHLORIDE 0.4 MG: 0.4 CAPSULE ORAL at 07:35

## 2020-11-30 RX ADMIN — CLOPIDOGREL BISULFATE 75 MG: 75 TABLET ORAL at 11:37

## 2020-11-30 RX ADMIN — AMLODIPINE BESYLATE 5 MG: 5 TABLET ORAL at 07:35

## 2020-11-30 RX ADMIN — ACETAMINOPHEN 650 MG: 325 TABLET ORAL at 07:35

## 2020-11-30 RX ADMIN — ASPIRIN 325 MG: 325 TABLET, FILM COATED ORAL at 11:37

## 2020-11-30 RX ADMIN — ENOXAPARIN SODIUM 30 MG: 30 INJECTION SUBCUTANEOUS at 11:37

## 2020-11-30 ASSESSMENT — PAIN SCALES - GENERAL
PAINLEVEL_OUTOF10: 3
PAINLEVEL_OUTOF10: 0
PAINLEVEL_OUTOF10: 0

## 2020-11-30 ASSESSMENT — PAIN DESCRIPTION - PAIN TYPE: TYPE: CHRONIC PAIN

## 2020-11-30 ASSESSMENT — PAIN DESCRIPTION - LOCATION: LOCATION: BACK

## 2020-11-30 NOTE — PROGRESS NOTES
Patient provided with discharge instructions, states verbal understanding. All questions answered, patient alert and stable at this time.

## 2020-11-30 NOTE — PROCEDURES
A Bladder scan was performed at 1200 . The patient's last void was at 1200 with 100 ml . The residual amount was measured to be 0 ML. Report of results was given to [facility].

## 2020-11-30 NOTE — PROCEDURES
A Bladder scan was performed at 2130 . The residual amount was measured to be 137 ML. Report of results was given to [facility].

## 2020-11-30 NOTE — PROGRESS NOTES
Updated patient's daughter, no further questions. Patient waiting on echo to be done. Resting in bed at this time.

## 2020-11-30 NOTE — PROGRESS NOTES
[address] [place] [address]  [address] [address]  Dept: [phone]  Loc: [phone]  Visit Date: 2020  Urology Progress Note    Chief Complaint: confusion    Subjective:   Patient is resting in bed, voiding spontaneously, +flatus,  ambulating with assistance, tolerating regular diet, denies any nausea or vomiting. There are complaints of no pain at this time.         Vitals:  /67   Pulse 88   Temp 98.5 °F (36.9 °C) (Oral)   Resp 18   Ht 5' 10\" (1.778 m)   Wt 169 lb 11.2 oz (77 kg)   SpO2 94%   BMI 24.35 kg/m²   Temp  Av.3 °F (36.8 °C)  Min: 97.7 °F (36.5 °C)  Max: 98.8 °F (37.1 °C)    Intake/Output Summary (Last 24 hours) at 2020 0915  Last data filed at 2020 0740  Gross per 24 hour   Intake 210 ml   Output 725 ml   Net -515 ml       Social History     Socioeconomic History    Marital status: Unknown     Spouse name: Not on file    Number of children: Not on file    Years of education: Not on file    Highest education level: Not on file   Occupational History    Not on file   Social Needs    Financial resource strain: Not on file    Food insecurity     Worry: Not on file     Inability: Not on file    Transportation needs     Medical: Not on file     Non-medical: Not on file   Tobacco Use    Smoking status: Never Smoker    Smokeless tobacco: Never Used   Substance and Sexual Activity    Alcohol use: Not on file    Drug use: Not on file    Sexual activity: Not on file   Lifestyle    Physical activity     Days per week: Not on file     Minutes per session: Not on file    Stress: Not on file   Relationships    Social connections     Talks on phone: Not on file     Gets together: Not on file     Attends [employer] service: Not on file     Active member of club or organization: Not on file     Attends meetings of clubs or organizations: Not on file     Relationship status: Not on file    Intimate partner violence 300 ml. Continue flomax  Will sign off  Follow up in office in 1 month with [facility].      Kendal Winchester, [place]  11/30/20 9:15 AM  Urology

## 2020-11-30 NOTE — PROGRESS NOTES
Called and gave patients daughter Arron Olsen an update on patients plan of care. All questions and concerns answered and addressed.

## 2020-11-30 NOTE — DISCHARGE INSTR - PHARMACY
Learning About COVID-19 and Social Distancing  What is it? Social distancing means putting space between yourself and other people. The recommended distance is 6 feet, or about 2 meters. This also means staying away from any place where people may gather, such as strong or other public gathering places. Why is it important? Social distancing is the best way to reduce the spread of COVID-19. This virus seems to spread from person to person through droplets from coughing and sneezing. So if you keep your distance from others, you're less likely to get it or spread it. And social distancing is important for everyone, not just those who are at high risk of infection, like older people. You might have the virus but not have symptoms. You could then give the infection to someone you come into contact with. How is it done? Putting 6 feet, or about 2 meters, between you and other people is the recommended distance. Also stay away from any place where people may gather, such as strong or other public gathering places. So if possible:  · Work from home, and keep your kids at home. · Don't travel if you don't have to. And avoid public transportation, ride-shares, and taxis unless you have no choice. · Limit shopping to essentials, like food and medicines. · Wear a cloth face cover if you have to go to a public place like the grocery store or pharmacy. · Don't eat in restaurants. (You can still get takeout or food deliveries.)  · Avoid crowds and busy places. Follow stay-at-home orders or other directions for your area. Current as of: July 10, 2020               Content Version: 12.6  © 2006-2020 [employer]. Care instructions adapted under license by [facility] ([facility]). If you have questions about a medical condition or this instruction, always ask your healthcare professional. [place] [address] any warranty or liability for your use of this information.          Coronavirus (FTRIY-92): Care Instructions  Overview  The coronavirus disease (COVID-19) is caused by a virus. Symptoms may include a fever, a cough, and shortness of breath. It mainly spreads person-to-person through droplets from coughing and sneezing. The virus also can spread when people are in close contact with someone who is infected. Most people have mild symptoms and can take care of themselves at home. If their symptoms get worse, they may need care in a hospital. Treatment may include medicines to reduce symptoms, plus breathing support such as oxygen therapy or a ventilator. It's important to not spread the virus to others. If you have COVID-19, wear a face cover anytime you are around other people. You need to isolate yourself while you are sick. Leave your home only if you need to get medical care or testing. Follow-up care is a key part of your treatment and safety. Be sure to make and go to all appointments, and call your doctor if you are having problems. It's also a good idea to know your test results and keep a list of the medicines you take. How can you care for yourself at home? · Get extra rest. It can help you feel better. · Drink plenty of fluids. This helps replace fluids lost from fever. Fluids also help ease a scratchy throat. Water, soup, fruit juice, and hot tea with lemon are good choices. · Take acetaminophen (such as Tylenol) to reduce a fever. It may also help with muscle aches. Read and follow all instructions on the label. · Use petroleum jelly on sore skin. This can help if the skin around your nose and lips becomes sore from rubbing a lot with tissues. Tips for self-isolation  · Limit contact with people in your home. If possible, stay in a separate bedroom and use a separate bathroom. · Wear a cloth face cover when you are around other people. It can help stop the spread of the virus when you cough or sneeze.   · If you have to leave home, avoid crowds and try to stay at least 6 feet away from other people. · Avoid contact with pets and other animals. · Cover your mouth and nose with a tissue when you cough or sneeze. Then throw it in the trash right away. · Wash your hands often, especially after you cough or sneeze. Use soap and water, and scrub for at least 20 seconds. If soap and water aren't available, use an alcohol-based hand . · Don't share personal household items. These include bedding, towels, cups and glasses, and eating utensils. · [address] in the warmest water allowed for the fabric type, and dry it completely. It's okay to wash other people's laundry with yours. · Clean and disinfect your home every day. Use household  and disinfectant wipes or sprays. Take special care to clean things that you grab with your hands. These include doorknobs, remote controls, phones, and handles on your refrigerator and microwave. And don't forget countertops, tabletops, bathrooms, and computer keyboards. When you can end self-isolation  · If you know or suspect that you have COVID-19, stay in self-isolation until:  ? You haven't had a fever for 3 days, and  ? Your symptoms have improved, and  ? It's been at least 10 days since your symptoms started. · Talk to your doctor about whether you also need testing, especially if you have a weakened immune system. When should you call for help? Call 911 anytime you think you may need emergency care. For example, call if you have life-threatening symptoms, such as:    · You have severe trouble breathing. (You can't talk at all.)     · You have constant chest pain or pressure.     · You are severely dizzy or lightheaded.     · You are confused or can't think clearly.     · Your face and lips have a blue color.     · You pass out (lose consciousness) or are very hard to wake up. Call your doctor now or seek immediate medical care if:    · You have moderate trouble breathing.  (You can't speak a full sentence.)     · You are coughing up blood (more than about 1 teaspoon).     · You have signs of low blood pressure. These include feeling lightheaded; being too weak to stand; and having cold, pale, clammy skin. Watch closely for changes in your health, and be sure to contact your doctor if:    · Your symptoms get worse.     · You are not getting better as expected. Call before you go to the doctor's office. Follow their instructions. And wear a cloth face cover. Current as of: July 10, 2020               Content Version: 12.6  © 2006-2020 [employer]. Care instructions adapted under license by [facility] ([facility]). If you have questions about a medical condition or this instruction, always ask your healthcare professional. [address] any warranty or liability for your use of this information. COVID-19 Viral Test: About This Test  What is it? A COVID-19 viral test is a way to find out if you have COVID-19. The test looks for the genetic material of the virus in cells from your breathing passages or lungs (respiratory system). This may also be called a nucleic acid or antigen test.  Why is it done? This test is used to diagnose a current infection with SARS-CoV-2, the virus that causes COVID-19. Knowing that you have the virus means that you can take steps to protect others from getting infected. This can help limit the spread of the virus. Knowing who has COVID-19 is also important for experts who track the virus. It can help them learn more about how the virus spreads. How do you prepare for the test?  You don't need to do anything to prepare for this test. But be sure to follow any instructions your health care provider gives you. How is it done? The test is most often done on a sample from your nose, throat, or lungs. It's sometimes done on a sample of saliva. One way a sample is collected is by putting a long swab into the back of your nose. What do your results mean?   The result is either positive or negative. A positive result means that the genetic material of the virus was found in your sample. You have COVID-19 now. A negative result means that the genetic material was not found. This may mean that you don't have COVID-19. But it's possible to get a \"false-negative\" result. This means that the test shows that you don't have COVID-19 when in fact you do. This may happen because you were tested too soon after you were infected, before the virus started to spread in your nose and throat. Or it could happen because the swab missed the infection. Current as of: July 10, 2020               Content Version: 12.6  © 2006-2020 [employer]. Care instructions adapted under license by [facility] ([facility]). If you have questions about a medical condition or this instruction, always ask your healthcare professional. [address] any warranty or liability for your use of this information. COVID-19 Antibody Test: About This Test  What is it? An antibody test looks for antibodies in the blood. These are proteins that your immune system makes, usually after you're exposed to germs like viruses or bacteria or after you get a vaccine. Antibodies work to fight illness. A COVID-19 antibody test looks for antibodies to SARS-CoV-2, the virus that causes COVID-19. If you test positive for these antibodies, it could mean that you already had COVID-19. Why is it done? This test can be used to diagnose a past infection with the virus that causes COVID-19. Many people who get COVID-19 never have symptoms or have only mild ones. Without antibody testing, these people might never know that they already had the virus. Antibody testing is important because:  · It could show who has already had COVID-19. These people might be safe to go back to work or school or to travel. · It could show who hasn't had the infection. These people are still at risk.  They need to keep taking steps to avoid the virus.  · It helps experts who are tracking COVID-19 learn more about the virus and how it spreads. How do you prepare for the test?  You don't need to do anything to prepare for this test. But be sure to follow any instructions your health care provider gives you. How is it done? This is a blood test. A health professional may prick your finger or use a needle to take a sample of blood from your arm. What do your results mean? The result is either positive or negative. A positive result means antibodies to SARS-CoV-2 were found. You probably already had COVID-19. But:  · You could get a \"false-positive\" result. The test might show that you have COVID-19 antibodies when you don't. The test may find antibodies that formed in response to another type of coronavirus. If this happens, you're still at risk for LLSAN-52.  · It's not certain that having these antibodies will protect you from getting COVID-19 again. And if it does, it's not clear how long the protection lasts. A negative result means that these antibodies were not found. You probably haven't had COVID-19. But:  · You could get a \"false-negative\" result. It takes a while after you're infected for your immune system to make antibodies. You could have a negative result but be infected now. You'd need a different test (viral test) to know if you have COVID-19 now. Current as of: July 10, 2020               Content Version: 12.6  © 2006-2020 [employer]. Care instructions adapted under license by [facility] ([facility]). If you have questions about a medical condition or this instruction, always ask your healthcare professional. [address] any warranty or liability for your use of this information.

## 2020-12-01 ENCOUNTER — CARE COORDINATION (OUTPATIENT)
Dept: CASE MANAGEMENT | Age: 85
End: 2020-12-01

## 2020-12-01 NOTE — CARE COORDINATION
[facility] Transitions Initial Follow Up Call    Call within 2 business days of discharge: Yes    Patient: Samir Garcia Patient : 1934   MRN: [id number]  Reason for Admission: covid  Discharge Date: 20 RARS: Readmission Risk Score: 11      Last Discharge [facility]       Complaint Diagnosis Description Type Department Provider    20   Admission (Discharged)  [facility] [address] [place], [place]           attempted covid+ mir. VM is not set up. Unable to leave a message     Follow Up  No future appointments.     Desi Merino RN

## 2020-12-01 NOTE — DISCHARGE SUMMARY
Hospitalist Discharge Summary        Patient: Chelita Coronel  YOB: 1934  MRN: [id number]   Acct: [de-identified]    Primary Care Physician: No primary care provider on file. Admit date  11/26/2020    Discharge date:  11/30/2020 5:03 PM    Chief Complaint on presentation :-  Confusion     Discharge Assessment and Plan:-   1. DIMITRY: Secondary to diarrhea and poor oral intake. Prerenal.  Creatinine 2.5, 1.3. Avoid nephrotoxic medications. 2. Urinary retention: Urology consulted. Pt was able to urinate without difficulty. Urology recommended 7 days of ABX for UTI. ABX prescribed. 3. Bilateral LE edema: Duplex ultrasound of lower extremities negative for DVT. Edema resolved. Echo was done in the hospital; pt will follow up with PCP. 4. Hematuria: Secondary to traumatic cath attempt. Hematuria resolved. Blood thinners restarted at [place]. 5. COVID-19: Incidentally found before port placement at [facility]. Patient is asymptomatic. Continue zinc, vitamin D and vitamin C.  6. Gastric CA: Follows at the [facility]. Patient is supposed to receive chemotherapy, however patient will need to recover from COVID-19.  7. CAD: Continue home medications. Initial H and P and Hospital course:-  \"86 y. o. male who presented to [address] [place] [address] with the above complain. 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 He was taken to [facility] ER due to concern of increased confusion at home. He was reported to be having diarrhea at home and barely eating for several days. Mental status on review during triage was thought to be appropriate but on checking labs, his creatinine was found to have gone up from normal to 2.2 and BUN of 57 in one week. Rest of vital signs were normal. Patient was swabbed for Covid which was positive.  At bedside, he still has difficulty providing reliable information although is fully orient to place and time. He saturating normal on room air and denies any ongoing pain. \"     Patient was admitted on 11/26 due to confusion. Patient was having urinary retention and nursing staff was unable to place [place]. Urology was consulted. After traumatic cath attempt, patient had hematuria. Therefore, all blood thinners were held. Urology came in assess the patient and did not believe patient needed catheter at this time. Bladder scans were done and patient began to urinate on his own. Patient continued to improve and feel better with IV fluids. Patient was able to urinate on his own for 2 days before being discharged. Urology recommended p.o. antibiotics at discharge due to UTI and urinary retention. Patient was discharged with those antibiotics. Patient was supposed to have chemotherapy and port placement due to gastric cancer at the [facility] in [place]. Patient will have to follow-up at the [facility] after COVID-19 has been cleared from his system. Patient understood that she needed to call the health department to make sure that she was in compliance with quarantine regulations. On the day of discharge, it was explained to the patient that it was very important to follow up with his PCP and Urology and Oncology to have continued care. Appointments were made and information was given. Physical Exam:-  General appearance: Chronically ill-appearing white male  HEENT: Pupils equal, round, and reactive to light. Conjunctivae/corneas clear. Neck: Supple, with full range of motion. No jugular venous distention. Trachea midline. Respiratory:  Normal respiratory effort on room air. Clear to auscultation, bilaterally without Rales/Wheezes/Rhonchi. Cardiovascular: Regular rate and rhythm with normal S1/S2 without murmurs, rubs or gallops. Abdomen: Soft, non-tender, non-distended with normal bowel sounds. Musculoskeletal: passive and active ROM x 4 extremities.   Skin: Skin color, texture, turgor normal.  No rashes or lesions. Neurologic:  Neurovascularly intact without any focal sensory/motor deficits.  Cranial nerves: II-XII intact, grossly non-focal.  Psychiatric: Alert and oriented, thought content appropriate, normal insight  Capillary Refill: Brisk,< 3 seconds   Peripheral Pulses: +2 palpable, equal bilaterally     Weight:   Weight: 169 lb 11.2 oz (77 kg)   24 hour intake/output:   No intake or output data in the 24 hours ending 12/01/20 1703    Discharge Medications:-      Medication List      START taking these medications    ascorbic acid 1000 MG tablet  Commonly known as:  VITAMIN C  Take 1 tablet by mouth daily     cefdinir 300 MG capsule  Commonly known as:  OMNICEF  Take 1 capsule by mouth every 12 hours for 7 days     vitamin D 50 MCG (2000 UT) Tabs tablet  Commonly known as:  CHOLECALCIFEROL  Take 1 tablet by mouth daily     zinc sulfate 220 (50 Zn) MG capsule  Commonly known as:  ZINCATE  Take 1 capsule by mouth daily        CONTINUE taking these medications    amLODIPine 5 MG tablet  Commonly known as:  NORVASC     aspirin 325 MG tablet     clopidogrel 75 MG tablet  Commonly known as:  PLAVIX     lisinopril 20 MG tablet  Commonly known as:  PRINIVIL;ZESTRIL     pantoprazole 40 MG tablet  Commonly known as:  PROTONIX     rosuvastatin 10 MG tablet  Commonly known as:  CRESTOR     tamsulosin 0.4 MG capsule  Commonly known as:  FLOMAX        STOP taking these medications    traMADol 50 MG tablet  Commonly known as:  ULTRAM           Where to Get Your Medications      These medications were sent to [facility] [address]  [address] [place] [address] Floor, [address] [address]    Phone:  [phone]   · ascorbic acid 1000 MG tablet  · cefdinir 300 MG capsule  · vitamin D 50 MCG (2000 UT) Tabs tablet     You can get these medications from any pharmacy    You don't need a prescription for these medications  · zinc sulfate 220 (50 Zn) MG information. Coronavirus ([id number]-[id number]): Care Instructions  Overview  The coronavirus disease (COVID-19) is caused by a virus. Symptoms may include a fever, a cough, and shortness of breath. It mainly spreads person-to-person through droplets from coughing and sneezing. The virus also can spread when people are in close contact with someone who is infected. Most people have mild symptoms and can take care of themselves at home. If their symptoms get worse, they may need care in a hospital. Treatment may include medicines to reduce symptoms, plus breathing support such as oxygen therapy or a ventilator. It's important to not spread the virus to others. If you have COVID-19, wear a face cover anytime you are around other people. You need to isolate yourself while you are sick. Leave your home only if you need to get medical care or testing. Follow-up care is a key part of your treatment and safety. Be sure to make and go to all appointments, and call your doctor if you are having problems. It's also a good idea to know your test results and keep a list of the medicines you take. How can you care for yourself at home? · Get extra rest. It can help you feel better. · Drink plenty of fluids. This helps replace fluids lost from fever. Fluids also help ease a scratchy throat. Water, soup, fruit juice, and hot tea with lemon are good choices. · Take acetaminophen (such as Tylenol) to reduce a fever. It may also help with muscle aches. Read and follow all instructions on the label. · Use petroleum jelly on sore skin. This can help if the skin around your nose and lips becomes sore from rubbing a lot with tissues. Tips for self-isolation  · Limit contact with people in your home. If possible, stay in a separate bedroom and use a separate bathroom. · Wear a cloth face cover when you are around other people. It can help stop the spread of the virus when you cough or sneeze.   · If you have to leave home, avoid crowds and try to stay at least 6 feet away from other people. · Avoid contact with pets and other animals. · Cover your mouth and nose with a tissue when you cough or sneeze. Then throw it in the trash right away. · Wash your hands often, especially after you cough or sneeze. Use soap and water, and scrub for at least 20 seconds. If soap and water aren't available, use an alcohol-based hand . · Don't share personal household items. These include bedding, towels, cups and glasses, and eating utensils. · [address] in the warmest water allowed for the fabric type, and dry it completely. It's okay to wash other people's laundry with yours. · Clean and disinfect your home every day. Use household  and disinfectant wipes or sprays. Take special care to clean things that you grab with your hands. These include doorknobs, remote controls, phones, and handles on your refrigerator and microwave. And don't forget countertops, tabletops, bathrooms, and computer keyboards. When you can end self-isolation  · If you know or suspect that you have COVID-19, stay in self-isolation until:  ? You haven't had a fever for 3 days, and  ? Your symptoms have improved, and  ? It's been at least 10 days since your symptoms started. · Talk to your doctor about whether you also need testing, especially if you have a weakened immune system. When should you call for help? Call 911 anytime you think you may need emergency care. For example, call if you have life-threatening symptoms, such as:    · You have severe trouble breathing. (You can't talk at all.)     · You have constant chest pain or pressure.     · You are severely dizzy or lightheaded.     · You are confused or can't think clearly.     · Your face and lips have a blue color.     · You pass out (lose consciousness) or are very hard to wake up. Call your doctor now or seek immediate medical care if:    · You have moderate trouble breathing.  (You can't speak a full sentence.)     · You are coughing up blood (more than about 1 teaspoon).     · You have signs of low blood pressure. These include feeling lightheaded; being too weak to stand; and having cold, pale, clammy skin. Watch closely for changes in your health, and be sure to contact your doctor if:    · Your symptoms get worse.     · You are not getting better as expected. Call before you go to the doctor's office. Follow their instructions. And wear a cloth face cover. Current as of: July 10, 2020               Content Version: 12.6  © 2006-2020 [employer]. Care instructions adapted under license by [facility] ([facility]). If you have questions about a medical condition or this instruction, always ask your healthcare professional. [address] any warranty or liability for your use of this information. COVID-19 Viral Test: About This Test  What is it? A COVID-19 viral test is a way to find out if you have COVID-19. The test looks for the genetic material of the virus in cells from your breathing passages or lungs (respiratory system). This may also be called a nucleic acid or antigen test.  Why is it done? This test is used to diagnose a current infection with SARS-CoV-2, the virus that causes COVID-19. Knowing that you have the virus means that you can take steps to protect others from getting infected. This can help limit the spread of the virus. Knowing who has COVID-19 is also important for experts who track the virus. It can help them learn more about how the virus spreads. How do you prepare for the test?  You don't need to do anything to prepare for this test. But be sure to follow any instructions your health care provider gives you. How is it done? The test is most often done on a sample from your nose, throat, or lungs. It's sometimes done on a sample of saliva. One way a sample is collected is by putting a long swab into the back of your nose.   What do risk. They need to keep taking steps to avoid the virus. · It helps experts who are tracking COVID-19 learn more about the virus and how it spreads. How do you prepare for the test?  You don't need to do anything to prepare for this test. But be sure to follow any instructions your health care provider gives you. How is it done? This is a blood test. A health professional may prick your finger or use a needle to take a sample of blood from your arm. What do your results mean? The result is either positive or negative. A positive result means antibodies to SARS-CoV-2 were found. You probably already had COVID-19. But:  · You could get a \"false-positive\" result. The test might show that you have COVID-19 antibodies when you don't. The test may find antibodies that formed in response to another type of coronavirus. If this happens, you're still at risk for [id number]-31.  · It's not certain that having these antibodies will protect you from getting COVID-19 again. And if it does, it's not clear how long the protection lasts. A negative result means that these antibodies were not found. You probably haven't had COVID-19. But:  · You could get a \"false-negative\" result. It takes a while after you're infected for your immune system to make antibodies. You could have a negative result but be infected now. You'd need a different test (viral test) to know if you have COVID-19 now. Current as of: July 10, 2020               Content Version: 12.6  © 2006-2020 [employer]. Care instructions adapted under license by [facility] ([facility]). If you have questions about a medical condition or this instruction, always ask your healthcare professional. [place] [address] any warranty or liability for your use of this information.                        Labs :-  Recent Results (from the past 72 hour(s))   Basic Metabolic Panel    Collection Time: 11/29/20  5:43 AM   Result Value Ref Range    Sodium 145 135 - post sternotomy. 2. Increased density at the left lung base which may represent atelectasis or scarring. 3. Atherosclerotic calcification aortic arch. 4. Otherwise negative chest x-ray. . **This report has been created using voice recognition software. It may contain minor errors which are inherent in voice recognition technology. ** Final report electronically signed by DR Pallavi Easley on 11/27/2020 4:27 PM    Vl Dup Lower Extremity Venous Bilateral    Result Date: 11/27/2020  PROCEDURE: VL DUP LOWER EXTREMITY VENOUS BILATERAL CLINICAL INFORMATION: edema. COMPARISON: No prior study. TECHNIQUE: Venous doppler ultrasound was performed of the bilateral lower extremities using gray scale, color flow and spectral doppler imaging. FINDINGS: Right leg: Deep veins (common femoral, femoral, popliteal, and calf veins): Patent and compressible, with spontaneous flow, phasicity, and satisfactory augmentation. Superficial veins (greater saphenous vein): Patent where visualized. Left leg: Deep veins (common femoral, femoral, popliteal, and calf veins): Patent and compressible, with spontaneous flow, phasicity, and satisfactory augmentation. Superficial veins (greater saphenous vein): Patent where visualized. Baker's cyst: None     No evidence of bilateral lower extremity DVT.  Final report electronically signed by Dr. Yunior Guerrero on 11/27/2020 8:41 PM     Consultations during this hospital stay:-  [] NONE [] Cardiology  [] Nephrology  [] Hemo onco   [] GI   [] ID  [] Endocrine  [] Pulm    [] Neuro    [] Psych   [x] Urology  [] ENT   [] G SURGERY   []Ortho    []CV surg    [] Palliative  [] Hospice [] Pain management   []    []TCU   [] PT/OT  OTHERS:-    Disposition: home  Condition at Discharge: Stable    Time Spent:- 40 minutes    Electronically signed by MARYJANE Jarquin on 12/1/2020 at 5:03 PM  Discharging Hospitalist

## 2020-12-02 ENCOUNTER — CARE COORDINATION (OUTPATIENT)
Dept: CASE MANAGEMENT | Age: 85
End: 2020-12-02

## 2020-12-02 NOTE — CARE COORDINATION
[facility] Transitions Initial Follow Up Call    Call within 2 business days of discharge: Yes    Patient: Joe Crowell Patient : 1934   MRN: [id number]  Reason for Admission: covid  Discharge Date: 20 RARS: Readmission Risk Score: 11      Last Discharge [facility]       Complaint Diagnosis Description Type Department Provider    20   Admission (Discharged) Katherin David           Second attempt for covid+ call. Left message to return mir    Follow Up  No future appointments.     Veena Rangel RN

## 2020-12-14 NOTE — PROGRESS NOTES
CLINICAL PHARMACY NOTE: MEDS TO [address] Select Patient?: No  Total # of Prescriptions Filled: 3   The following medications were delivered to the patient:  Cefdinir 300mg  Vitamin C 1000mg  Vitamin D3 2000iu  Total # of Interventions Completed: 2  Time Spent (min): 30    Additional Documentation: